# Patient Record
Sex: MALE | Race: WHITE
[De-identification: names, ages, dates, MRNs, and addresses within clinical notes are randomized per-mention and may not be internally consistent; named-entity substitution may affect disease eponyms.]

---

## 2018-06-22 ENCOUNTER — HOSPITAL ENCOUNTER (OUTPATIENT)
Dept: HOSPITAL 88 - CATH LAB | Age: 58
Discharge: HOME | End: 2018-06-22
Attending: INTERNAL MEDICINE
Payer: SELF-PAY

## 2018-06-22 VITALS — SYSTOLIC BLOOD PRESSURE: 146 MMHG | DIASTOLIC BLOOD PRESSURE: 76 MMHG

## 2018-06-22 VITALS — SYSTOLIC BLOOD PRESSURE: 133 MMHG | DIASTOLIC BLOOD PRESSURE: 76 MMHG

## 2018-06-22 VITALS — DIASTOLIC BLOOD PRESSURE: 86 MMHG | SYSTOLIC BLOOD PRESSURE: 134 MMHG

## 2018-06-22 VITALS — DIASTOLIC BLOOD PRESSURE: 81 MMHG | SYSTOLIC BLOOD PRESSURE: 145 MMHG

## 2018-06-22 VITALS — SYSTOLIC BLOOD PRESSURE: 127 MMHG | DIASTOLIC BLOOD PRESSURE: 80 MMHG

## 2018-06-22 DIAGNOSIS — I20.0: Primary | ICD-10-CM

## 2018-06-22 DIAGNOSIS — Z79.82: ICD-10-CM

## 2018-06-22 DIAGNOSIS — I48.0: ICD-10-CM

## 2018-06-22 DIAGNOSIS — I10: ICD-10-CM

## 2018-06-22 LAB
ALBUMIN SERPL-MCNC: 4 G/DL (ref 3.5–5)
ALP SERPL-CCNC: 50 IU/L (ref 40–150)
ALT SERPL-CCNC: 20 IU/L (ref 0–55)
ANION GAP SERPL CALC-SCNC: 13.6 MMOL/L (ref 8–16)
BASOPHILS # BLD AUTO: 0 10*3/UL (ref 0–0.1)
BASOPHILS NFR BLD AUTO: 0.6 % (ref 0–1)
BILIRUB CONJ SERPL-MCNC: 0.4 MG/DL (ref 0–0.5)
BUN SERPL-MCNC: 17 MG/DL (ref 7–26)
BUN/CREAT SERPL: 21 (ref 6–25)
CALCIUM SERPL-MCNC: 9.3 MG/DL (ref 8.4–10.2)
CHLORIDE SERPL-SCNC: 101 MMOL/L (ref 98–107)
CHOLEST SERPL-MCNC: 175 MD/DL (ref 0–199)
CHOLEST/HDLC SERPL: 3.4 {RATIO} (ref 3.9–4.7)
CO2 SERPL-SCNC: 26 MMOL/L (ref 22–29)
DEPRECATED INR PLAS: 1.16
DEPRECATED INR PLAS: 4.39
DEPRECATED NEUTROPHILS # BLD AUTO: 3.8 10*3/UL (ref 2.1–6.9)
EGFRCR SERPLBLD CKD-EPI 2021: > 60 ML/MIN (ref 60–?)
EOSINOPHIL # BLD AUTO: 0.1 10*3/UL (ref 0–0.4)
EOSINOPHIL NFR BLD AUTO: 1.2 % (ref 0–6)
ERYTHROCYTE [DISTWIDTH] IN CORD BLOOD: 12.3 % (ref 11.7–14.4)
GLUCOSE SERPLBLD-MCNC: 86 MG/DL (ref 74–118)
HCT VFR BLD AUTO: 40.9 % (ref 38.2–49.6)
HDLC SERPL-MSCNC: 51 MG/DL (ref 40–60)
HGB BLD-MCNC: 13.6 G/DL (ref 14–18)
LDLC SERPL CALC-MCNC: 106 MG/DL (ref 60–130)
LYMPHOCYTES # BLD: 2.3 10*3/UL (ref 1–3.2)
LYMPHOCYTES NFR BLD AUTO: 33.3 % (ref 18–39.1)
MCH RBC QN AUTO: 29.2 PG (ref 28–32)
MCHC RBC AUTO-ENTMCNC: 33.3 G/DL (ref 31–35)
MCV RBC AUTO: 87.8 FL (ref 81–99)
MONOCYTES # BLD AUTO: 0.6 10*3/UL (ref 0.2–0.8)
MONOCYTES NFR BLD AUTO: 8.7 % (ref 4.4–11.3)
NEUTS SEG NFR BLD AUTO: 55.9 % (ref 38.7–80)
PLATELET # BLD AUTO: 238 X10E3/UL (ref 140–360)
POTASSIUM SERPL-SCNC: 3.6 MMOL/L (ref 3.5–5.1)
PROTHROMBIN TIME: 13.9 SECONDS (ref 11.9–14.5)
PROTHROMBIN TIME: 39.4 SECONDS (ref 11.9–14.5)
RBC # BLD AUTO: 4.66 X10E6/UL (ref 4.3–5.7)
SODIUM SERPL-SCNC: 137 MMOL/L (ref 136–145)
TRIGL SERPL-MCNC: 92 MG/DL (ref 0–149)
TSH SERPL DL<=0.005 MIU/L-ACNC: 0.92 UIU/ML (ref 0.35–4.94)

## 2018-06-22 PROCEDURE — 83036 HEMOGLOBIN GLYCOSYLATED A1C: CPT

## 2018-06-22 PROCEDURE — 84443 ASSAY THYROID STIM HORMONE: CPT

## 2018-06-22 PROCEDURE — 80061 LIPID PANEL: CPT

## 2018-06-22 PROCEDURE — 85025 COMPLETE CBC W/AUTO DIFF WBC: CPT

## 2018-06-22 PROCEDURE — 80076 HEPATIC FUNCTION PANEL: CPT

## 2018-06-22 PROCEDURE — 93458 L HRT ARTERY/VENTRICLE ANGIO: CPT

## 2018-06-22 PROCEDURE — 80048 BASIC METABOLIC PNL TOTAL CA: CPT

## 2018-06-22 PROCEDURE — 36415 COLL VENOUS BLD VENIPUNCTURE: CPT

## 2018-06-22 PROCEDURE — 85610 PROTHROMBIN TIME: CPT

## 2018-06-22 NOTE — HISTORY AND PHYSICAL
CHIEF COMPLAINT:  Crescendo chest pain and palpitations. 



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 

history of hypertension and paroxysmal atrial fibrillation with documented 

strips per his report.  At different various times on aspirin 325 mg daily, 

not on other thrombolic preventive medications, per his report.  He has 

noted lately episodes of increasing frequency, duration and intensity of 

chest discomfort, which is elicited by emotional stress as well as physical 

exertion.  At times this has been related with episodes of palpitations, 

and he himself documented episodes of atrial fibrillation at the time.  He 

has noted symptoms to worsen within the last 2 weeks and presents for 

evaluation of his unstable symptoms.  Currently he is chest pain free and 

on EKG in sinus rhythm, nonspecific repolarization abnormalities observed.  





REVIEW OF SYSTEMS:  Twelve systems reviewed and negative except for as 

noted above. 



ALLERGIES:  NO KNOWN DRUG ALLERGIES.  



PAST MEDICAL HISTORY:  Hypertension and paroxysmal atrial fibrillation 

reported. 



SOCIAL HISTORY:  Negative for smoking, alcohol or drugs. 



FAMILY HISTORY:  Noncontributory.   He has good family support.  



PHYSICAL EXAMINATION 

VITAL SIGNS:  Heart rate 54, on telemetry in sinus rhythm, blood pressure 

143/79, O2 sat 99% on room air.  Respiratory rate 18.  

GENERAL:  In no acute distress, alert, active. 

NECK:  No JVD, no carotid bruits. 

CHEST:  Clear to auscultation. 

CARDIOVASCULAR:  Regular rate and rhythm.  Normal S1 and S2.  No S3, no S4. 

 No murmurs or rubs. 

ABDOMEN:  Soft and nontender, nondistended. 

EXTREMITIES:  No cyanosis, clubbing or edema.  Normal Rafa's test.  

Palpable 2+ pulses to bilateral lower extremities.  



MEDICATIONS:  Reviewed.  



STUDIES:  Discussed per report, recent lab work within one year was 

unremarkable. 



ASSESSMENT   

1. Unstable angina. 

2. Paroxysmal atrial fibrillation. 

3. Hypertension. 



RECOMMENDATIONS:  Discussed indications, alternatives, risks and benefits 

for coronary angiography and possible coronary intervention.  Alternatives 

discussed included absolute myocardial perfusion PET scan versus SPECT 

myocardial perfusion stress test.  Given unstable crescendo symptoms, Domo 

prefers to proceed with coronary angiography.  Will schedule expeditedly.  

Further recommendations to follow.  







DD:  06/22/2018 18:31

DT:  06/22/2018 18:53

Job#:  [id number]

## 2018-06-23 NOTE — DISCHARGE SUMMARY
DATE OF SERVICE:  June 22, 2018



ADMITTING DIAGNOSES:

1. Unstable angina.

2. Reported history of paroxysmal atrial fibrillation.

3. Hypertension.



DISCHARGE DIAGNOSES:  Atypical chest pain and palpitations in the setting 

of reported atrial fibrillation and borderline blood pressure reads with no 

significant obstructive coronary artery disease on catheterization.



CONDITION UPON DISCHARGE:  Fair, improved.  Discharged to home for self 

care.  Restrictions, post cath site precautions discussed.



MEDICATIONS UPON DISCHARGE:  Please see medication reconciliation form.



For medications at home, please see medication reconciliation form, which 

includes:

1. Metoprolol succinate 25 mg tablet, half a tablet every day.

2. Aspirin 325 mg daily. 



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 

in the setting of crescendo symptoms concerning for unstable angina.  

Cardiac catheterization was remarkable for no significant obstructive 

coronary artery disease, preserved left ventricular systolic function, and 

no gradient across the aortic valve.  He remained in normal sinus rhythm 

throughout the study.  He reports, however, prior episodes of paroxysmal 

atrial fibrillation, which he has noted on previous telemetry strips, for 

which he is taking aspirin 325 mg daily.  He reports systolic blood 

pressure mainly in the 120s to low 130s, while in the hospital blood 

pressure was in the 130s to 140s, at home blood pressure log has been 

discussed and advised.  Recommendations as outpatient include obtain 

echocardiogram, obtain Holter or telemetry monitor for further evaluation.  

Resting heart rate in the low 60s to high 50s, however, does go up with 

activity.



Discussed alternatives for management of confirmed atrial fibrillation, 

consider PVI versus antiarrhythmics. Start with low-dose beta blocker. 

Advise on echo. Labs ordered and discussed.  Follow 

up in office in 4 weeks. 





 _________________________________

ANIL SWANSON MD



DD:  06/22/2018 23:23

DT:  06/22/2018 23:59

Job#:  [id number] DR RENEE

## 2020-12-29 ENCOUNTER — HOSPITAL ENCOUNTER (OUTPATIENT)
Dept: HOSPITAL 88 - VACCPMC | Age: 60
End: 2020-12-29
Payer: COMMERCIAL

## 2020-12-29 DIAGNOSIS — Z20.828: ICD-10-CM

## 2020-12-29 DIAGNOSIS — Z23: Primary | ICD-10-CM

## 2021-02-01 ENCOUNTER — HOSPITAL ENCOUNTER (OUTPATIENT)
Dept: HOSPITAL 88 - VACCPMC | Age: 61
End: 2021-02-01
Payer: COMMERCIAL

## 2021-02-01 DIAGNOSIS — Z20.822: ICD-10-CM

## 2021-02-01 DIAGNOSIS — Z23: Primary | ICD-10-CM
